# Patient Record
Sex: FEMALE | Race: WHITE | NOT HISPANIC OR LATINO | URBAN - METROPOLITAN AREA
[De-identification: names, ages, dates, MRNs, and addresses within clinical notes are randomized per-mention and may not be internally consistent; named-entity substitution may affect disease eponyms.]

---

## 2023-11-13 ENCOUNTER — OFFICE VISIT (OUTPATIENT)
Dept: URBAN - METROPOLITAN AREA CLINIC 63 | Facility: CLINIC | Age: 48
End: 2023-11-13

## 2023-11-16 ENCOUNTER — LAB OUTSIDE AN ENCOUNTER (OUTPATIENT)
Dept: URBAN - METROPOLITAN AREA CLINIC 63 | Facility: CLINIC | Age: 48
End: 2023-11-16

## 2023-11-16 ENCOUNTER — OFFICE VISIT (OUTPATIENT)
Dept: URBAN - METROPOLITAN AREA CLINIC 63 | Facility: CLINIC | Age: 48
End: 2023-11-16
Payer: COMMERCIAL

## 2023-11-16 VITALS
TEMPERATURE: 97.8 F | SYSTOLIC BLOOD PRESSURE: 120 MMHG | HEIGHT: 66 IN | BODY MASS INDEX: 32.95 KG/M2 | DIASTOLIC BLOOD PRESSURE: 80 MMHG | WEIGHT: 205 LBS | HEART RATE: 68 BPM

## 2023-11-16 DIAGNOSIS — R10.32 LLQ PAIN: ICD-10-CM

## 2023-11-16 DIAGNOSIS — R19.4 CHANGE IN BOWEL HABITS: ICD-10-CM

## 2023-11-16 PROCEDURE — 99204 OFFICE O/P NEW MOD 45 MIN: CPT | Performed by: PHYSICIAN ASSISTANT

## 2023-11-16 NOTE — PHYSICAL EXAM GASTROINTESTINAL
Abdomen , soft, mild llq ttp, nondistended , no guarding or rigidity , no masses palpable , normal bowel sounds , Liver and Spleen , no hepatomegaly present , no hepatosplenomegaly , liver nontender , spleen not palpable

## 2023-11-16 NOTE — HPI-TODAY'S VISIT:
48-year-old female with history of alcohol use disorder, and PFO is referred to the office for change in bowel habits.  She saw her PCP on August 31, 2023 reporting constipation over 6 days and she was using stool softeners.  She states she has had constipation on and off for years but this has been worsening recently. She has had pinching and stabbing pain in the LLQ. This occurs when she has not been able to have a BM. Over the past 2 days, she has had pain multple times througout the days but she can also go days without haivng any abdomen. She started using thrive vitamin patches and shakes and will usually have a BM every day. She went on a 3 day trip and was not using her thrive product and did not have a BM at all. Once she got back and started her usual regimen, she has been having regular BMs. She denies any blood in the stool. She denies any unintended weight loss.   No prior colonoscopy.  She had a negative stool Cologuard test in November 2021. No   She has no family history of colon cancer. Her mother had complicated diverticulitis with resection and ostomy.

## 2023-11-27 ENCOUNTER — TELEPHONE ENCOUNTER (OUTPATIENT)
Dept: URBAN - METROPOLITAN AREA CLINIC 64 | Facility: CLINIC | Age: 48
End: 2023-11-27

## 2023-12-04 ENCOUNTER — TELEPHONE ENCOUNTER (OUTPATIENT)
Dept: URBAN - METROPOLITAN AREA CLINIC 63 | Facility: CLINIC | Age: 48
End: 2023-12-04

## 2023-12-29 ENCOUNTER — OUT OF OFFICE VISIT (OUTPATIENT)
Dept: URBAN - METROPOLITAN AREA SURGERY CENTER 4 | Facility: SURGERY CENTER | Age: 48
End: 2023-12-29
Payer: COMMERCIAL

## 2023-12-29 ENCOUNTER — CLAIMS CREATED FROM THE CLAIM WINDOW (OUTPATIENT)
Dept: URBAN - METROPOLITAN AREA CLINIC 4 | Facility: CLINIC | Age: 48
End: 2023-12-29
Payer: COMMERCIAL

## 2023-12-29 DIAGNOSIS — K63.5 POLYP OF TRANSVERSE COLON, UNSPECIFIED TYPE: ICD-10-CM

## 2023-12-29 DIAGNOSIS — Z12.11 ENCOUNTER FOR SCREENING FOR MALIGNANT NEOPLASM OF COLON: ICD-10-CM

## 2023-12-29 DIAGNOSIS — K64.0 FIRST DEGREE HEMORRHOIDS: ICD-10-CM

## 2023-12-29 DIAGNOSIS — K64.0 GRADE I INTERNAL HEMORRHOIDS: ICD-10-CM

## 2023-12-29 DIAGNOSIS — Z12.11 COLON CANCER SCREENING (HIGH RISK): ICD-10-CM

## 2023-12-29 DIAGNOSIS — D12.3 BENIGN NEOPLASM OF TRANSVERSE COLON: ICD-10-CM

## 2023-12-29 DIAGNOSIS — K63.5 BENIGN COLONIC POLYP: ICD-10-CM

## 2023-12-29 PROCEDURE — 88305 TISSUE EXAM BY PATHOLOGIST: CPT | Performed by: PATHOLOGY

## 2023-12-29 PROCEDURE — 45380 COLONOSCOPY AND BIOPSY: CPT | Performed by: INTERNAL MEDICINE

## 2023-12-29 PROCEDURE — 00811 ANES LWR INTST NDSC NOS: CPT | Performed by: NURSE ANESTHETIST, CERTIFIED REGISTERED

## 2024-01-11 ENCOUNTER — DASHBOARD ENCOUNTERS (OUTPATIENT)
Age: 49
End: 2024-01-11

## 2024-01-11 ENCOUNTER — OFFICE VISIT (OUTPATIENT)
Dept: URBAN - METROPOLITAN AREA CLINIC 63 | Facility: CLINIC | Age: 49
End: 2024-01-11
Payer: COMMERCIAL

## 2024-01-11 VITALS
SYSTOLIC BLOOD PRESSURE: 122 MMHG | DIASTOLIC BLOOD PRESSURE: 84 MMHG | WEIGHT: 198 LBS | TEMPERATURE: 97.9 F | BODY MASS INDEX: 31.82 KG/M2 | HEIGHT: 66 IN | OXYGEN SATURATION: 98 % | HEART RATE: 82 BPM

## 2024-01-11 DIAGNOSIS — N83.209 CYST OF OVARY, UNSPECIFIED LATERALITY: ICD-10-CM

## 2024-01-11 DIAGNOSIS — K64.8 HEMORRHOIDS, INTERNAL: ICD-10-CM

## 2024-01-11 DIAGNOSIS — Z86.010 PERSONAL HISTORY OF COLONIC POLYPS: ICD-10-CM

## 2024-01-11 DIAGNOSIS — K59.04 CHRONIC IDIOPATHIC CONSTIPATION: ICD-10-CM

## 2024-01-11 PROBLEM — 82934008: Status: ACTIVE | Noted: 2024-01-11

## 2024-01-11 PROBLEM — 428283002: Status: ACTIVE | Noted: 2024-01-11

## 2024-01-11 PROCEDURE — 99214 OFFICE O/P EST MOD 30 MIN: CPT | Performed by: PHYSICIAN ASSISTANT

## 2024-01-11 NOTE — HPI-TODAY'S VISIT:
48-year-old female with alcohol use disorder and PFO was referred to the office in November 2023 for a change in her normal bowel habits.  She saw her PCP in August reporting constipation over 6 days and she was using stool softeners at the time. At her initial office visit she reported off-and-on constipation for years but this had been worsening rate recently.  She reported that she would have a pinching and stabbing sensation in the left lower quadrant of the abdomen when unable to have a bowel movement.  She then started using Thrive vitamin patches and shakes and began having bowel movements every day.  She went on a 3-day trip and was not using her Thrive products and had no bowel movement at all.  When she got home and began using her supplements again she started having regular bowel movements. She was sent for a CT scan of the abdomen and pelvis which was done on November 27, 2023.  This demonstrated no acute abnormality in the abdomen or pelvis.  Prior hysterectomy.  Cystic changes of both ovaries.  Follow-up ultrasound was recommended in 3 months to assure stability or resolution.  There was a small fat-containing umbilical hernia without strangulation.  Small cyst of the liver was noted. Colonoscopy was done on December 29, 2023.  Her colonoscopy demonstrated a 4 mm tubular adenoma which was removed from the transverse colon.  Grade 1 internal hemorrhoids were observed.  Repeat colonoscopy was recommended in 5 years. She follows up doing well.  She had no problems following her procedure. Bowel habits have been regular. No GI complaints.  She has no family history of colon cancer.

## 2024-01-16 ENCOUNTER — OFFICE VISIT (OUTPATIENT)
Dept: URBAN - METROPOLITAN AREA CLINIC 63 | Facility: CLINIC | Age: 49
End: 2024-01-16